# Patient Record
Sex: FEMALE | Race: WHITE | Employment: FULL TIME | URBAN - METROPOLITAN AREA
[De-identification: names, ages, dates, MRNs, and addresses within clinical notes are randomized per-mention and may not be internally consistent; named-entity substitution may affect disease eponyms.]

---

## 2018-10-27 ENCOUNTER — HOSPITAL ENCOUNTER (OUTPATIENT)
Age: 48
Discharge: HOME | End: 2018-10-27
Attending: EMERGENCY MEDICINE
Payer: COMMERCIAL

## 2018-10-27 VITALS
HEIGHT: 61 IN | BODY MASS INDEX: 24.55 KG/M2 | DIASTOLIC BLOOD PRESSURE: 68 MMHG | WEIGHT: 130 LBS | HEART RATE: 102 BPM | TEMPERATURE: 98 F | OXYGEN SATURATION: 96 % | SYSTOLIC BLOOD PRESSURE: 132 MMHG | RESPIRATION RATE: 22 BRPM

## 2018-10-27 DIAGNOSIS — H60.12 CELLULITIS OF LEFT EARLOBE: Primary | ICD-10-CM

## 2018-10-27 PROCEDURE — 99204 OFFICE O/P NEW MOD 45 MIN: CPT

## 2018-10-27 PROCEDURE — 99213 OFFICE O/P EST LOW 20 MIN: CPT

## 2018-10-27 RX ORDER — CIPROFLOXACIN 500 MG/1
500 TABLET, FILM COATED ORAL 2 TIMES DAILY
Qty: 14 TABLET | Refills: 0 | Status: SHIPPED | OUTPATIENT
Start: 2018-10-27 | End: 2018-11-03

## 2018-10-27 NOTE — ED INITIAL ASSESSMENT (HPI)
Pt reports possible left ear piercing infection. Reports got it done 3 weeks ago states last few days has been painful with redness and swelling noted yesterday. Pt denies any discharge.

## 2018-10-27 NOTE — ED PROVIDER NOTES
Patient Seen in: [address]    History   Patient presents with:  Ear Piercing    Stated Complaint: ear piercing infection    HPI    56-year-old female patient presents her complaining of pain to the left ear for the past if she develops fever or worsening swelling or redness.             Disposition and Plan     Clinical Impression:  Cellulitis of left earlobe  (primary encounter diagnosis)    Disposition:  Discharge  10/27/2018 11:36 am    Follow-up:  [facility] AND [facility]

## 2018-12-27 ENCOUNTER — OFFICE VISIT (OUTPATIENT)
Dept: FAMILY MEDICINE CLINIC | Facility: CLINIC | Age: 48
End: 2018-12-27
Payer: COMMERCIAL

## 2018-12-27 VITALS
DIASTOLIC BLOOD PRESSURE: 83 MMHG | OXYGEN SATURATION: 98 % | RESPIRATION RATE: 16 BRPM | SYSTOLIC BLOOD PRESSURE: 128 MMHG | HEART RATE: 92 BPM

## 2018-12-27 DIAGNOSIS — H10.32 ACUTE CONJUNCTIVITIS OF LEFT EYE, UNSPECIFIED ACUTE CONJUNCTIVITIS TYPE: ICD-10-CM

## 2018-12-27 DIAGNOSIS — J06.9 VIRAL URI WITH COUGH: ICD-10-CM

## 2018-12-27 DIAGNOSIS — J02.9 SORE THROAT: Primary | ICD-10-CM

## 2018-12-27 PROCEDURE — 99213 OFFICE O/P EST LOW 20 MIN: CPT | Performed by: PHYSICIAN ASSISTANT

## 2018-12-27 PROCEDURE — 87880 STREP A ASSAY W/OPTIC: CPT | Performed by: PHYSICIAN ASSISTANT

## 2018-12-27 RX ORDER — FLUTICASONE PROPIONATE 50 MCG
2 SPRAY, SUSPENSION (ML) NASAL DAILY
Qty: 1 INHALER | Refills: 0 | Status: SHIPPED | OUTPATIENT
Start: 2018-12-27

## 2018-12-27 RX ORDER — CIPROFLOXACIN HYDROCHLORIDE 3.5 MG/ML
1-2 SOLUTION/ DROPS TOPICAL 4 TIMES DAILY
Qty: 5 ML | Refills: 0 | Status: SHIPPED | OUTPATIENT
Start: 2018-12-27 | End: 2019-01-03

## 2018-12-27 NOTE — PATIENT INSTRUCTIONS
EYE  -Warm compress 10 minutes 3-4 times per day.     VIRAL URI  -Cool mist humidifier at night  -Warm tea with honey  -Mucinex-D  -Flonase  -Motrin for pain or fever  -Go to ER with worsening symptoms              Bacterial Conjunctivitis    You have an in these occur:  · Worsening vision  · Increasing pain in the eye  · Increasing swelling or redness of the eyelid  · Redness spreading around the eye  Date Last Reviewed: 7/1/2017  © 2100-1898 [employer].  [id number] [address] [address] and lungs. · Over-the-counter cold medicines will not shorten the length of time you’re sick, but they may be helpful for the following symptoms: cough, sore throat, and nasal and sinus congestion.  (Note: Do not use decongestants if you have high blood pr

## 2018-12-27 NOTE — PROGRESS NOTES
CHIEF COMPLAINT:   Patient presents with:  Pink Eye  Cough      HPI:   Diallo Preciado is a 53year old female who presents for possible pink eye for since morning. Patient reports sticky discharge, left eye stuck together this morning, left eye pink.   No pa EARS: TM's not erythematous, no bulging, no retraction, no fluid, bony landmarks intact. EACs WNL BL. NOSE: Nostrils patent, no nasal discharge, nasal mucosa pink   THROAT: oral mucosa pink, moist. Posterior pharynx  erythematous and injected.  No exuda -Warm tea with honey  -Mucinex-D  -Flonase  -Motrin for pain or fever  -Go to ER with worsening symptoms              Bacterial Conjunctivitis    You have an infection in the membranes covering the white part of the eye.  This part of the eye is called the · Increasing swelling or redness of the eyelid  · Redness spreading around the eye  Date Last Reviewed: 7/1/2017  © 0887-2702 [employer]. [address] [facility], [address] [place] [address]. All rights reserved.  This information is not intended as a s · Over-the-counter cold medicines will not shorten the length of time you’re sick, but they may be helpful for the following symptoms: cough, sore throat, and nasal and sinus congestion.  (Note: Do not use decongestants if you have high blood pressure.)  Fo

## 2021-08-13 ENCOUNTER — HOSPITAL ENCOUNTER (OUTPATIENT)
Age: 51
Discharge: ANOTHER HOSPITAL | End: 2021-08-13
Payer: COMMERCIAL

## 2021-08-13 VITALS
HEART RATE: 77 BPM | TEMPERATURE: 98 F | RESPIRATION RATE: 18 BRPM | OXYGEN SATURATION: 99 % | SYSTOLIC BLOOD PRESSURE: 158 MMHG | DIASTOLIC BLOOD PRESSURE: 95 MMHG

## 2021-08-13 DIAGNOSIS — R07.89 CHEST PAIN, ATYPICAL: Primary | ICD-10-CM

## 2021-08-13 PROCEDURE — 93000 ELECTROCARDIOGRAM COMPLETE: CPT | Performed by: EMERGENCY MEDICINE

## 2021-08-13 PROCEDURE — 99203 OFFICE O/P NEW LOW 30 MIN: CPT | Performed by: EMERGENCY MEDICINE

## 2021-08-13 NOTE — ED INITIAL ASSESSMENT (HPI)
Pt states last month having a bad cough and having coughing fits, pt states now when running feeling heaviness and pain in chest.